# Patient Record
Sex: FEMALE | Race: WHITE | NOT HISPANIC OR LATINO | Employment: OTHER | URBAN - METROPOLITAN AREA
[De-identification: names, ages, dates, MRNs, and addresses within clinical notes are randomized per-mention and may not be internally consistent; named-entity substitution may affect disease eponyms.]

---

## 2023-03-30 ENCOUNTER — OFFICE VISIT (OUTPATIENT)
Dept: OBSTETRICS AND GYNECOLOGY | Facility: CLINIC | Age: 73
End: 2023-03-30
Payer: MEDICARE

## 2023-03-30 VITALS — SYSTOLIC BLOOD PRESSURE: 156 MMHG | WEIGHT: 142 LBS | DIASTOLIC BLOOD PRESSURE: 83 MMHG | HEART RATE: 68 BPM

## 2023-03-30 DIAGNOSIS — Z01.419 WELL WOMAN EXAM WITH ROUTINE GYNECOLOGICAL EXAM: Primary | ICD-10-CM

## 2023-03-30 PROCEDURE — G0101 CA SCREEN;PELVIC/BREAST EXAM: HCPCS | Mod: GZ,S$GLB,, | Performed by: STUDENT IN AN ORGANIZED HEALTH CARE EDUCATION/TRAINING PROGRAM

## 2023-03-30 PROCEDURE — G0101 PR CA SCREEN;PELVIC/BREAST EXAM: ICD-10-PCS | Mod: GZ,S$GLB,, | Performed by: STUDENT IN AN ORGANIZED HEALTH CARE EDUCATION/TRAINING PROGRAM

## 2023-03-30 RX ORDER — LEVOTHYROXINE SODIUM 25 UG/1
25 TABLET ORAL
COMMUNITY
Start: 2023-02-12

## 2023-03-30 RX ORDER — RIVAROXABAN 10 MG/1
10 TABLET, FILM COATED ORAL
COMMUNITY
Start: 2023-03-20

## 2023-03-30 RX ORDER — VALACYCLOVIR HYDROCHLORIDE 500 MG/1
TABLET, FILM COATED ORAL
COMMUNITY
Start: 2023-01-24

## 2023-03-30 RX ORDER — LIOTHYRONINE SODIUM 5 UG/1
5 TABLET ORAL 2 TIMES DAILY
COMMUNITY
Start: 2023-02-10

## 2023-03-30 RX ORDER — LOSARTAN POTASSIUM AND HYDROCHLOROTHIAZIDE 12.5; 1 MG/1; MG/1
1 TABLET ORAL EVERY MORNING
COMMUNITY
Start: 2023-02-27

## 2023-03-30 RX ORDER — EZETIMIBE 10 MG/1
10 TABLET ORAL
COMMUNITY
Start: 2023-02-20

## 2023-03-30 RX ORDER — MONTELUKAST SODIUM 10 MG/1
10 TABLET ORAL
COMMUNITY
Start: 2023-03-02

## 2023-03-30 NOTE — PROGRESS NOTES
Chief Complaint: Annual Exam    HPI: 73 y.o.  here for Annual Exam. She has no complaints today.     Review of Systems   Constitutional:  Negative for chills and fever.   HENT:  Negative for congestion and sore throat.    Respiratory:  Negative for cough and shortness of breath.    Cardiovascular:  Negative for chest pain and palpitations.   Gastrointestinal:  Negative for abdominal pain, nausea and vomiting.   Genitourinary:  Negative for dysuria, frequency and urgency.   Musculoskeletal:  Negative for back pain.   Skin:  Negative for itching and rash.   Neurological:  Negative for headaches.   All other systems reviewed and are negative.    Past Medical History:   Diagnosis Date    Asthma     Deep vein thrombosis     Factor 5 Leiden mutation, heterozygous     Hyperlipidemia     Hypertension     Thyroid disease      Past Surgical History wrist and foot surgery, BTL  Past OB History:   Past Gyn History: Denies prior abnormal pap smears, denies STD's   Contraception History: status post hysterectomy  Social History: denies t/d/e  Family History + breast cancer - sister, denies colon, ovarian, or uterine cancer  Review of patient's allergies indicates:   Allergen Reactions    Levaquin [levofloxacin]      Current Outpatient Medications   Medication Instructions    ezetimibe (ZETIA) 10 mg, Oral    levothyroxine (SYNTHROID) 25 mcg, Oral    liothyronine (CYTOMEL) 5 mcg, Oral, 2 times daily    losartan-hydrochlorothiazide 100-12.5 mg (HYZAAR) 100-12.5 mg Tab 1 tablet, Oral, Every morning    montelukast (SINGULAIR) 10 mg, Oral    valACYclovir (VALTREX) 500 MG tablet No dose, route, or frequency recorded.    XARELTO 10 mg, Oral       Physical Exam:  Vitals:    23 1312   BP: (!) 156/83   Pulse: 68     There is no height or weight on file to calculate BMI.  Physical Exam  Vitals reviewed. Exam conducted with a chaperone present.   Constitutional:       General: She is not in acute distress.     Appearance:  Normal appearance. She is normal weight.   HENT:      Head: Normocephalic and atraumatic.   Eyes:      Extraocular Movements: Extraocular movements intact.      Pupils: Pupils are equal, round, and reactive to light.   Pulmonary:      Effort: Pulmonary effort is normal. No respiratory distress.   Chest:   Breasts:     Breasts are symmetrical.      Right: No inverted nipple, mass, nipple discharge, skin change or tenderness.      Left: No inverted nipple, mass, nipple discharge, skin change or tenderness.   Abdominal:      General: There is no distension.      Palpations: Abdomen is soft.      Tenderness: There is no abdominal tenderness.   Genitourinary:     Comments: Normal external female genitalia, no masses or lesions, vagina atrophic, no vaginal bleeding or discharge, not friable cervix  Musculoskeletal:         General: No swelling or tenderness. Normal range of motion.      Cervical back: Normal range of motion and neck supple.   Lymphadenopathy:      Upper Body:      Right upper body: No supraclavicular or axillary adenopathy.      Left upper body: No supraclavicular or axillary adenopathy.   Skin:     General: Skin is warm and dry.   Neurological:      General: No focal deficit present.      Mental Status: She is alert and oriented to person, place, and time.          ASSESSMENT:   Patient is a 73 y.o.  who presents for annual exam     PLAN:  Pap  not indicated   Mammogram - utd, wnl this year  TSH, Lipid profile, Diabetes screening, Colon cancer screening, DEXA, Urinalysis - followed by PCP  Counseling on self-breast exams, diet, and exercise.  RTC in 2 yr for annual or sooner if needed